# Patient Record
Sex: MALE | Race: WHITE | NOT HISPANIC OR LATINO | Employment: OTHER | URBAN - METROPOLITAN AREA
[De-identification: names, ages, dates, MRNs, and addresses within clinical notes are randomized per-mention and may not be internally consistent; named-entity substitution may affect disease eponyms.]

---

## 2018-09-10 ENCOUNTER — OFFICE VISIT (OUTPATIENT)
Dept: URGENT CARE | Facility: CLINIC | Age: 73
End: 2018-09-10
Payer: MEDICARE

## 2018-09-10 VITALS
HEIGHT: 72 IN | SYSTOLIC BLOOD PRESSURE: 126 MMHG | RESPIRATION RATE: 16 BRPM | OXYGEN SATURATION: 95 % | TEMPERATURE: 97.6 F | BODY MASS INDEX: 27.71 KG/M2 | WEIGHT: 204.6 LBS | DIASTOLIC BLOOD PRESSURE: 80 MMHG | HEART RATE: 80 BPM

## 2018-09-10 DIAGNOSIS — L98.9 SKIN LESION: ICD-10-CM

## 2018-09-10 PROCEDURE — 99214 OFFICE O/P EST MOD 30 MIN: CPT | Performed by: FAMILY MEDICINE

## 2018-09-10 NOTE — PROGRESS NOTES
Subjective:      Clarence Becker is a 73 y.o. male who presents with Nodule (x 7-10 days, Small bump on LT. temple, getting bigger)    Chief Complaint   Patient presents with   • Nodule     x 7-10 days, Small bump on LT. temple, getting bigger        - This is a very pleasant 73 y.o. male with complaints of a growth popped up abruptly Lt temple 2 wks ago. A little itch at times. No trauma or NVFC, has appt w/ Derm to look at it later this month but says he has been nagged to have someone medical to  look at it sooner           ALLERGIES:  Patient has no allergy information on record.     PMH:  No past medical history on file.     MEDS:    Current Outpatient Prescriptions:   •  Butalbital-APAP-Caff-Cod (FIORICET/CODEINE PO), Take  by mouth., Disp: , Rfl:     ** I have documented what I find to be significant in regards to past medical, social, family and surgical history  in my HPI or under PMH/PSH/FH review section, otherwise it is contributory **             HPI    Review of Systems   Skin: Positive for itching.        Growth Lt temple    All other systems reviewed and are negative.         Objective:     /80   Pulse 80   Temp 36.4 °C (97.6 °F)   Resp 16   Ht 1.829 m (6')   Wt 92.8 kg (204 lb 9.6 oz)   SpO2 95%   BMI 27.75 kg/m²      Physical Exam   Constitutional: He appears well-developed. No distress.   HENT:   Head: Normocephalic and atraumatic.   Pulmonary/Chest: Effort normal. No respiratory distress.   Neurological: He is alert. He exhibits normal muscle tone.   Skin: Skin is warm. Rash ( Lt University keratoacanthoma looking lesion ) noted.   Psychiatric: He has a normal mood and affect. Judgment normal.   Nursing note and vitals reviewed.              Assessment/Plan:         1. Skin lesion  REFERRAL TO FAMILY PRACTICE         - f/u w/ derm as planned      Dx & d/c instructions discussed w/ patient and/or family members.     ER precautions (worsening signs symptoms) discussed.    Follow up w/  PCP in 2-3 days to make sure symptoms improving and no further intervention/treatment and/or work-up needed was advised, ER if feeling worse or not improving in 2 days.    Possible side effects (i.e. Rash, GI upset/constipation, sedation, elevation of BP or sugars) of any medications given discussed.

## 2022-02-14 ENCOUNTER — HOSPITAL ENCOUNTER (EMERGENCY)
Facility: MEDICAL CENTER | Age: 77
End: 2022-02-14
Attending: EMERGENCY MEDICINE
Payer: MEDICARE

## 2022-02-14 VITALS
HEART RATE: 74 BPM | TEMPERATURE: 98.6 F | DIASTOLIC BLOOD PRESSURE: 74 MMHG | HEIGHT: 72 IN | RESPIRATION RATE: 14 BRPM | OXYGEN SATURATION: 95 % | SYSTOLIC BLOOD PRESSURE: 126 MMHG | BODY MASS INDEX: 26.93 KG/M2 | WEIGHT: 198.85 LBS

## 2022-02-14 DIAGNOSIS — K04.7 PERIAPICAL ABSCESS WITH FACIAL INVOLVEMENT: ICD-10-CM

## 2022-02-14 DIAGNOSIS — K02.9 DENTAL CARIES: ICD-10-CM

## 2022-02-14 PROCEDURE — 99284 EMERGENCY DEPT VISIT MOD MDM: CPT

## 2022-02-14 PROCEDURE — 700102 HCHG RX REV CODE 250 W/ 637 OVERRIDE(OP): Performed by: EMERGENCY MEDICINE

## 2022-02-14 PROCEDURE — A9270 NON-COVERED ITEM OR SERVICE: HCPCS | Performed by: EMERGENCY MEDICINE

## 2022-02-14 RX ORDER — AMOXICILLIN 500 MG/1
500 TABLET, FILM COATED ORAL 3 TIMES DAILY
Qty: 21 TABLET | Refills: 0 | Status: SHIPPED | OUTPATIENT
Start: 2022-02-14 | End: 2022-02-14 | Stop reason: SDUPTHER

## 2022-02-14 RX ORDER — AMOXICILLIN 500 MG/1
500 TABLET, FILM COATED ORAL 3 TIMES DAILY
Qty: 21 TABLET | Refills: 0 | Status: SHIPPED | OUTPATIENT
Start: 2022-02-14

## 2022-02-14 RX ORDER — HYDROCODONE BITARTRATE AND ACETAMINOPHEN 5; 325 MG/1; MG/1
1 TABLET ORAL EVERY 4 HOURS PRN
Qty: 10 TABLET | Refills: 0 | Status: SHIPPED | OUTPATIENT
Start: 2022-02-14 | End: 2022-02-18

## 2022-02-14 RX ORDER — AMOXICILLIN 250 MG/1
1000 CAPSULE ORAL ONCE
Status: COMPLETED | OUTPATIENT
Start: 2022-02-14 | End: 2022-02-14

## 2022-02-14 RX ADMIN — AMOXICILLIN 1000 MG: 250 CAPSULE ORAL at 15:40

## 2022-02-14 NOTE — DISCHARGE INSTRUCTIONS
Dental Pain (Tooth Ache)    Start antibiotics today and take ibuprofen 600-800 mg every 6 hours or naproxen 500 mg every 8-12 hours for pain.    Return to [facility] for ill appearance, shortness of breath, difficulty swallowing or significant facial swelling.  Followup with your dentist, the on call oral surgeon Dr. Rdz, Dr. Alberto Guevara ([phone], [address] [place] [address]), or Dr. Orlando Espinal ([phone]) in 4 days.    You had an elevated or high normal blood pressure reading today.  This does not necessarily mean you have hypertension.  Please followup with your/a primary physician for comprehensive blood pressure evaluation.  BP Readings from Last 3 Encounters:   02/14/22 126/74   09/10/18 126/80       You have been seen by your caregiver because of a tooth ache. This may be caused by cavities (tooth decay) which expose the nerve of the tooth to air and hot or cold temperatures, which cause pain. It may come from an infection or abscess (also called a boil or furuncle) around your tooth, also often caused by dental caries (tooth decay). This causes the pain you are having.    Diagnosis (how do you tell what is wrong)  Your caregiver can diagnose this problem often by exam.    Treatment  If caused by an infection it may be treated with antibiotics (medications which kill germs) and pain medications as prescribed by your caregiver. Take medications as directed.  You may take ibuprofen (Advil® or Motrin®), acetaminophen (Tylenol®), or both, as needed for pain or temperature. Ibuprofen and acetaminophen may be taken together in their regular dosages.  Whether the tooth ache today is caused by infection or dental disease, it is advisable to see your dentist as soon as possible for further care.     Call or return to this location if:  The exam and treatment you received today has been provided on an emergency basis only. This is not a substitute for complete medical or dental care. If your problem worsens or new symptoms  (problems) appear, and you are unable to arrange prompt follow-up care with your dentist, call or return to this location.      [facility]® Patient Information ©2007 [employer].

## 2022-02-14 NOTE — ED PROVIDER NOTES
"ED Provider Note    Scribed for Eusebio West M.D. by Mica Porter. 2/14/2022  2:49 PM    Primary care provider: No primary care provider noted.  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 Scribe remained outside the patient's room and did not have any contact with the patient for the duration of patient encounter.      HPI  Clarence Becker is a 76 y.o. male who presents to the Emergency Department right-sided lower dental pain onset 3 days ago. He states his pain fluctuates from a 5/10-8/10 in severity. His pain is exacerbated with chewing on that side. He says his bridge fell out 2 months ago. He is additionally experiencing right-sided facial swelling. He denies fever, chills, dysphagia, shortness of breath, nausea, and vomiting. He denies a history of diabetes. He has a history of intracranial bleed, hemiplegic migraine, and pacemaker placement. He denies taking any blood thinners. He denies any known allergies to medication.    REVIEW OF SYSTEMS  Pertinent positives include: dental pain and facial swelling.  Pertinent negatives include: fever, chills, dysphagia, shortness of breath, nausea, and vomiting.    PAST MEDICAL HISTORY  Past Medical History:   Diagnosis Date   • Migraines    • other     traumatic ICB       FAMILY HISTORY  History reviewed. No pertinent family history.    SOCIAL HISTORY  Social History     Tobacco Use   • Smoking status: Never Smoker   • Smokeless tobacco: Never Used   Vaping Use   • Vaping Use: Never used   Substance Use Topics   • Alcohol use: Never   • " Drug use: Never     Social History     Substance and Sexual Activity   Drug Use Never       SURGICAL HISTORY  Past Surgical History:   Procedure Laterality Date   • OTHER CARDIAC SURGERY      PPM 12/11/21   • OTHER NEUROLOGICAL SURG      ICB clot evacuation 1/8/22       CURRENT MEDICATIONS  Current Outpatient Medications   Medication Instructions   • Butalbital-APAP-Caff-Cod (FIORICET/CODEINE PO) Oral       ALLERGIES  No Known Allergies    PHYSICAL EXAM  VITAL SIGNS: BP (!) 169/64   Pulse 88   Temp 37 °C (98.6 °F) (Temporal)   Resp 16   Ht 1.829 m (6')   Wt 90.2 kg (198 lb 13.7 oz)   SpO2 92%   BMI 26.97 kg/m²   Reviewed and elevated blood pressure, afebrile, no hypoxia room air  Constitutional: Well developed, Well nourished, in no acute distress.  HENT: Normocephalic, atraumatic, bilateral external ears normal, 1st molar has dental caries extending to the gum.  Eyes: PERRLA, conjunctiva pink, no scleral icterus.   Cardiovascular: Regular rate and rhythm. No murmurs, rubs or gallops.  No dependent edema or calf tenderness  Respiratory: Lungs clear to auscultation bilaterally. No wheezes, rales, or rhonchi.  Abdominal:  Abdomen soft, non-tender, non distended. No rebound, or guarding.    Skin: No erythema, no rash.   Genitourinary: No costovertebral angle tenderness.   Musculoskeletal: no deformities.   Neurologic: Alert & oriented x 3, cranial nerves 2-12 intact by passive exam.  No focal deficit noted.  Psychiatric: Affect normal, Judgment normal, Mood normal.     DIFFERENTIAL DIAGNOSIS:  Dental caries, dental abscess.     INTERVENTIONS:  Medications   amoxicillin (AMOXIL) capsule 1,000 mg (1,000 mg Oral Given 2/14/22 1540)     ED COURSE:  Nursing notes, VS, PMSFHx reviewed in chart.     2:49 PM - Patient seen and examined at bedside. Patient will be treated with Amoxil 1000 mg for his symptoms. I discussed the plan for discharge at this time with follow up instructions with an oral surgeon. I will  prescribe Norco for pain control and antiboitics. Patient verbalizes understanding and agreement to this plan of care.     Prescription monitoring queried and score 0  Opiate risk tool utilized and patient low risk  Informed consent obtained for opiate analgesic  Indication opiate analgesic: non-narcotic, oral analgesic alternatives have been inadequate for pain control    MEDICAL DECISION MAKING:  This patient presents with a periapical abscess of his right lower first molar with facial involvement and an associated dental caries to gingiva.  There is no pointing abscess, Ludewig's angina or airway compromise.    PLAN:  New Prescriptions    AMOXICILLIN 500 MG TAB    Take 1 Tablet by mouth 3 times a day.    HYDROCODONE-ACETAMINOPHEN (NORCO) 5-325 MG TAB PER TABLET    Take 1 Tablet by mouth every four hours as needed (mild pain) for up to 4 days.     Dental pain handout given  Return for worsening facial swelling, shortness of breath, fever, dysphagia, or other concerns.     Jose Rdz D.D.S.  [address]  [facility] [address]  [phone]    Schedule an appointment as soon as possible for a visit in 1 day      CONDITION: good.     FINAL IMPRESSION  1. Periapical abscess with facial involvement    2. Dental caries          Mica MANE (Scribe), am scribing for, and in the presence of, Eusebio West M.D..    Electronically signed by: Mica Porter ([place]), 2/14/2022    Eusebio MANE M.D. personally performed the services described in this documentation, as scribed by Mica Porter in my presence, and it is both accurate and complete.    The note accurately reflects work and decisions made by me.  Eusebio West M.D.  2/14/2022  4:09 PM

## 2022-02-15 NOTE — ED NOTES
Reviewed discharge instructions and prescriptions x 2 sent to selected Pharmacy w/ pt, verbalized understanding to information provided including follow up care and return precautions, denied questions/concerns.  Narcotic consent signed and placed on chart.  Pt ambulated from ED.